# Patient Record
Sex: FEMALE | Race: BLACK OR AFRICAN AMERICAN
[De-identification: names, ages, dates, MRNs, and addresses within clinical notes are randomized per-mention and may not be internally consistent; named-entity substitution may affect disease eponyms.]

---

## 2019-01-16 ENCOUNTER — HOSPITAL ENCOUNTER (EMERGENCY)
Dept: HOSPITAL 84 - D.ER | Age: 49
Discharge: HOME | End: 2019-01-16
Payer: MEDICARE

## 2019-01-16 VITALS — WEIGHT: 155.33 LBS | HEIGHT: 68 IN | BODY MASS INDEX: 23.54 KG/M2

## 2019-01-16 VITALS — SYSTOLIC BLOOD PRESSURE: 121 MMHG | DIASTOLIC BLOOD PRESSURE: 67 MMHG

## 2019-01-16 DIAGNOSIS — R51: ICD-10-CM

## 2019-01-16 DIAGNOSIS — Z94.0: ICD-10-CM

## 2019-01-16 DIAGNOSIS — N18.9: ICD-10-CM

## 2019-01-16 DIAGNOSIS — R50.9: Primary | ICD-10-CM

## 2019-01-16 DIAGNOSIS — I12.9: ICD-10-CM

## 2019-01-16 DIAGNOSIS — R11.2: ICD-10-CM

## 2019-01-16 DIAGNOSIS — R19.7: ICD-10-CM

## 2019-01-16 LAB
ALBUMIN SERPL-MCNC: 4.1 G/DL (ref 3.4–5)
ALP SERPL-CCNC: 89 U/L (ref 46–116)
ALT SERPL-CCNC: 18 U/L (ref 10–68)
ANION GAP SERPL CALC-SCNC: 17.9 MMOL/L (ref 8–16)
BASOPHILS NFR BLD AUTO: 0.3 % (ref 0–2)
BILIRUB SERPL-MCNC: 0.27 MG/DL (ref 0.2–1.3)
BUN SERPL-MCNC: 20 MG/DL (ref 7–18)
CALCIUM SERPL-MCNC: 8.6 MG/DL (ref 8.5–10.1)
CHLORIDE SERPL-SCNC: 100 MMOL/L (ref 98–107)
CO2 SERPL-SCNC: 22.7 MMOL/L (ref 21–32)
CREAT SERPL-MCNC: 3.2 MG/DL (ref 0.6–1.3)
EOSINOPHIL NFR BLD: 2.3 % (ref 0–7)
ERYTHROCYTE [DISTWIDTH] IN BLOOD BY AUTOMATED COUNT: 14.9 % (ref 11.5–14.5)
GLOBULIN SER-MCNC: 3.4 G/L
GLUCOSE SERPL-MCNC: 99 MG/DL (ref 74–106)
HCT VFR BLD CALC: 33 % (ref 36–48)
HGB BLD-MCNC: 10.9 G/DL (ref 12–16)
IMM GRANULOCYTES NFR BLD: 0.1 % (ref 0–5)
LYMPHOCYTES NFR BLD AUTO: 28.1 % (ref 15–50)
MAGNESIUM SERPL-MCNC: 1.6 MG/DL (ref 1.8–2.4)
MCH RBC QN AUTO: 30.2 PG (ref 26–34)
MCHC RBC AUTO-ENTMCNC: 33 G/DL (ref 31–37)
MCV RBC: 91.4 FL (ref 80–100)
MONOCYTES NFR BLD: 9.6 % (ref 2–11)
NEUTROPHILS NFR BLD AUTO: 59.6 % (ref 40–80)
OSMOLALITY SERPL CALC.SUM OF ELEC: 274 MOSM/KG (ref 275–300)
PLATELET # BLD: 345 10X3/UL (ref 130–400)
PMV BLD AUTO: 8.5 FL (ref 7.4–10.4)
POTASSIUM SERPL-SCNC: 4.6 MMOL/L (ref 3.5–5.1)
PROT SERPL-MCNC: 7.5 G/DL (ref 6.4–8.2)
RBC # BLD AUTO: 3.61 10X6/UL (ref 4–5.4)
SODIUM SERPL-SCNC: 136 MMOL/L (ref 136–145)
WBC # BLD AUTO: 7.1 10X3/UL (ref 4.8–10.8)

## 2019-01-29 ENCOUNTER — HOSPITAL ENCOUNTER (INPATIENT)
Dept: HOSPITAL 84 - D.ER | Age: 49
LOS: 2 days | Discharge: HOME | DRG: 305 | End: 2019-01-31
Attending: INTERNAL MEDICINE | Admitting: INTERNAL MEDICINE
Payer: MEDICARE

## 2019-01-29 VITALS — SYSTOLIC BLOOD PRESSURE: 169 MMHG | DIASTOLIC BLOOD PRESSURE: 107 MMHG

## 2019-01-29 VITALS — DIASTOLIC BLOOD PRESSURE: 81 MMHG | SYSTOLIC BLOOD PRESSURE: 130 MMHG

## 2019-01-29 VITALS — DIASTOLIC BLOOD PRESSURE: 86 MMHG | SYSTOLIC BLOOD PRESSURE: 136 MMHG

## 2019-01-29 VITALS — SYSTOLIC BLOOD PRESSURE: 162 MMHG | DIASTOLIC BLOOD PRESSURE: 99 MMHG

## 2019-01-29 VITALS — DIASTOLIC BLOOD PRESSURE: 82 MMHG | SYSTOLIC BLOOD PRESSURE: 128 MMHG

## 2019-01-29 VITALS
WEIGHT: 157.33 LBS | BODY MASS INDEX: 23.84 KG/M2 | BODY MASS INDEX: 23.84 KG/M2 | HEIGHT: 68 IN | WEIGHT: 157.33 LBS | BODY MASS INDEX: 23.84 KG/M2 | HEIGHT: 68 IN

## 2019-01-29 VITALS — SYSTOLIC BLOOD PRESSURE: 148 MMHG | DIASTOLIC BLOOD PRESSURE: 90 MMHG

## 2019-01-29 VITALS — DIASTOLIC BLOOD PRESSURE: 94 MMHG | SYSTOLIC BLOOD PRESSURE: 146 MMHG

## 2019-01-29 VITALS — DIASTOLIC BLOOD PRESSURE: 108 MMHG | SYSTOLIC BLOOD PRESSURE: 176 MMHG

## 2019-01-29 VITALS — DIASTOLIC BLOOD PRESSURE: 97 MMHG | SYSTOLIC BLOOD PRESSURE: 154 MMHG

## 2019-01-29 VITALS — SYSTOLIC BLOOD PRESSURE: 168 MMHG | DIASTOLIC BLOOD PRESSURE: 123 MMHG

## 2019-01-29 VITALS — DIASTOLIC BLOOD PRESSURE: 88 MMHG | SYSTOLIC BLOOD PRESSURE: 148 MMHG

## 2019-01-29 VITALS — DIASTOLIC BLOOD PRESSURE: 91 MMHG | SYSTOLIC BLOOD PRESSURE: 151 MMHG

## 2019-01-29 VITALS — SYSTOLIC BLOOD PRESSURE: 161 MMHG | DIASTOLIC BLOOD PRESSURE: 107 MMHG

## 2019-01-29 VITALS — DIASTOLIC BLOOD PRESSURE: 76 MMHG | SYSTOLIC BLOOD PRESSURE: 121 MMHG

## 2019-01-29 VITALS — SYSTOLIC BLOOD PRESSURE: 137 MMHG | DIASTOLIC BLOOD PRESSURE: 86 MMHG

## 2019-01-29 VITALS — SYSTOLIC BLOOD PRESSURE: 175 MMHG | DIASTOLIC BLOOD PRESSURE: 98 MMHG

## 2019-01-29 VITALS — SYSTOLIC BLOOD PRESSURE: 170 MMHG | DIASTOLIC BLOOD PRESSURE: 102 MMHG

## 2019-01-29 VITALS — SYSTOLIC BLOOD PRESSURE: 158 MMHG | DIASTOLIC BLOOD PRESSURE: 100 MMHG

## 2019-01-29 VITALS — SYSTOLIC BLOOD PRESSURE: 163 MMHG | DIASTOLIC BLOOD PRESSURE: 103 MMHG

## 2019-01-29 VITALS — DIASTOLIC BLOOD PRESSURE: 89 MMHG | SYSTOLIC BLOOD PRESSURE: 147 MMHG

## 2019-01-29 VITALS — SYSTOLIC BLOOD PRESSURE: 127 MMHG | DIASTOLIC BLOOD PRESSURE: 76 MMHG

## 2019-01-29 VITALS — SYSTOLIC BLOOD PRESSURE: 143 MMHG | DIASTOLIC BLOOD PRESSURE: 89 MMHG

## 2019-01-29 VITALS — DIASTOLIC BLOOD PRESSURE: 88 MMHG | SYSTOLIC BLOOD PRESSURE: 143 MMHG

## 2019-01-29 VITALS — DIASTOLIC BLOOD PRESSURE: 99 MMHG | SYSTOLIC BLOOD PRESSURE: 180 MMHG

## 2019-01-29 VITALS — DIASTOLIC BLOOD PRESSURE: 113 MMHG | SYSTOLIC BLOOD PRESSURE: 175 MMHG

## 2019-01-29 VITALS — DIASTOLIC BLOOD PRESSURE: 100 MMHG | SYSTOLIC BLOOD PRESSURE: 158 MMHG

## 2019-01-29 VITALS — SYSTOLIC BLOOD PRESSURE: 153 MMHG | DIASTOLIC BLOOD PRESSURE: 91 MMHG

## 2019-01-29 VITALS — SYSTOLIC BLOOD PRESSURE: 159 MMHG | DIASTOLIC BLOOD PRESSURE: 92 MMHG

## 2019-01-29 VITALS — SYSTOLIC BLOOD PRESSURE: 186 MMHG | DIASTOLIC BLOOD PRESSURE: 119 MMHG

## 2019-01-29 VITALS — SYSTOLIC BLOOD PRESSURE: 164 MMHG | DIASTOLIC BLOOD PRESSURE: 103 MMHG

## 2019-01-29 VITALS — SYSTOLIC BLOOD PRESSURE: 113 MMHG | DIASTOLIC BLOOD PRESSURE: 69 MMHG

## 2019-01-29 VITALS — SYSTOLIC BLOOD PRESSURE: 114 MMHG | DIASTOLIC BLOOD PRESSURE: 67 MMHG

## 2019-01-29 VITALS — SYSTOLIC BLOOD PRESSURE: 157 MMHG | DIASTOLIC BLOOD PRESSURE: 74 MMHG

## 2019-01-29 VITALS — DIASTOLIC BLOOD PRESSURE: 101 MMHG | SYSTOLIC BLOOD PRESSURE: 161 MMHG

## 2019-01-29 DIAGNOSIS — N18.3: ICD-10-CM

## 2019-01-29 DIAGNOSIS — D63.1: ICD-10-CM

## 2019-01-29 DIAGNOSIS — I16.0: Primary | ICD-10-CM

## 2019-01-29 DIAGNOSIS — K21.9: ICD-10-CM

## 2019-01-29 DIAGNOSIS — I47.1: ICD-10-CM

## 2019-01-29 DIAGNOSIS — I12.9: ICD-10-CM

## 2019-01-29 DIAGNOSIS — I24.8: ICD-10-CM

## 2019-01-29 DIAGNOSIS — Z94.0: ICD-10-CM

## 2019-01-29 LAB
ALBUMIN SERPL-MCNC: 4 G/DL (ref 3.4–5)
ALP SERPL-CCNC: 84 U/L (ref 46–116)
ALT SERPL-CCNC: 14 U/L (ref 10–68)
ANION GAP SERPL CALC-SCNC: 14 MMOL/L (ref 8–16)
BASOPHILS NFR BLD AUTO: 0.3 % (ref 0–2)
BILIRUB SERPL-MCNC: 0.38 MG/DL (ref 0.2–1.3)
BUN SERPL-MCNC: 14 MG/DL (ref 7–18)
CALCIUM SERPL-MCNC: 8.9 MG/DL (ref 8.5–10.1)
CHLORIDE SERPL-SCNC: 98 MMOL/L (ref 98–107)
CK MB SERPL-MCNC: 0.8 U/L (ref 0–3.6)
CK SERPL-CCNC: 219 UL (ref 21–215)
CO2 SERPL-SCNC: 25.8 MMOL/L (ref 21–32)
CREAT SERPL-MCNC: 2.7 MG/DL (ref 0.6–1.3)
EOSINOPHIL NFR BLD: 2.3 % (ref 0–7)
ERYTHROCYTE [DISTWIDTH] IN BLOOD BY AUTOMATED COUNT: 14.1 % (ref 11.5–14.5)
GLOBULIN SER-MCNC: 3.4 G/L
GLUCOSE SERPL-MCNC: 109 MG/DL (ref 74–106)
HCT VFR BLD CALC: 34.3 % (ref 36–48)
HGB BLD-MCNC: 11.4 G/DL (ref 12–16)
IMM GRANULOCYTES NFR BLD: 0.1 % (ref 0–5)
LYMPHOCYTES NFR BLD AUTO: 33.3 % (ref 15–50)
MCH RBC QN AUTO: 30.6 PG (ref 26–34)
MCHC RBC AUTO-ENTMCNC: 33.2 G/DL (ref 31–37)
MCV RBC: 92 FL (ref 80–100)
MONOCYTES NFR BLD: 10 % (ref 2–11)
NEUTROPHILS NFR BLD AUTO: 54 % (ref 40–80)
OSMOLALITY SERPL CALC.SUM OF ELEC: 269 MOSM/KG (ref 275–300)
PLATELET # BLD: 310 10X3/UL (ref 130–400)
PMV BLD AUTO: 8.7 FL (ref 7.4–10.4)
POTASSIUM SERPL-SCNC: 3.8 MMOL/L (ref 3.5–5.1)
PROT SERPL-MCNC: 7.4 G/DL (ref 6.4–8.2)
RBC # BLD AUTO: 3.73 10X6/UL (ref 4–5.4)
SODIUM SERPL-SCNC: 134 MMOL/L (ref 136–145)
TROPONIN I SERPL-MCNC: < 0.017 NG/ML (ref 0–0.06)
WBC # BLD AUTO: 8 10X3/UL (ref 4.8–10.8)

## 2019-01-29 NOTE — NUR
REASSESSMENT COMPLETE, NO NEW CHANGES AT THIS TIME. PT RESTING QUIETLY WITH
VSS, NO S/S OF DISTRESS. PT DENIES NEEDS AT THIS TIME. CALL LIGHT WITHIN PT
REACH. CPOC.

## 2019-01-29 NOTE — NUR
PRIOR TO FIRST SL NITRO, PT RATED CHEST PRESSURE 10/10, FOLLOWING NITRO, PT
RATED DISCOMFORT 9/10. SECOND PRN DOSE ADMINISTERED.

## 2019-01-29 NOTE — NUR
PT ASSISTED UP TO TOILET. GAIT STEADY. URINATED. PT FELT LIKE WAS SHORT OF
BREATH AND HEART BEATING FAST. C/O CHEST DISCOMFORT IN RIGHT CHEST.
READING 'S  HR. AFTER SITUATED IN BED AND RELAX HR HAS DROPPED TO
110. BP CONTINUES TO REMAIN ELEVATED. ON CARDENE GTT AT THIS TIME. CONTINUING
TO TITRATE

## 2019-01-29 NOTE — NUR
PT AOX4, FOLLOWS COMMANDS. PUPILS EQUAL AND REACTIVE,  EQUAL, TONGUE
MIDLINE. NO S/S OF WEAKNESS, STANDS WITH A STEADY GAIT, AMBULATES WITH A
STEADY GAIT. RESPIRATIONS UNLABORED, SPO2 96 ON ROOM AIR. S1S2 HEARD,
PERIPHERAL PULSES PRESENT.  ON MONITOR. BOWEL SOUNDS ACTIVE IN ALL
QUADRANTS. LT UPPER ARM FISTULA CDI, THRILL AND BRUIT PRESENT. PT REPOSITIONS
SELF INDEPENDENTLY. CALL LIGHT WITHIN PT REACH, DENIES NEEDS. CPOC.

## 2019-01-29 NOTE — NUR
PT UP TO BATHROOM WITH MINIMAL ASSISTANCE. STEADY GAIT, VSS, NO S/S OF
DISTRESS. REPOSITIONED BACK IN BED FOR COMFORT. HS MEDS GIVEN WITH FRESH
WATER. PT DENIES ANY FURTHER NEEDS AT THIS TIME. CALL LIGHT WITHIN PT REACH.
CPOC.

## 2019-01-30 VITALS — DIASTOLIC BLOOD PRESSURE: 102 MMHG | SYSTOLIC BLOOD PRESSURE: 153 MMHG

## 2019-01-30 VITALS — SYSTOLIC BLOOD PRESSURE: 139 MMHG | DIASTOLIC BLOOD PRESSURE: 85 MMHG

## 2019-01-30 VITALS — DIASTOLIC BLOOD PRESSURE: 91 MMHG | SYSTOLIC BLOOD PRESSURE: 140 MMHG

## 2019-01-30 VITALS — DIASTOLIC BLOOD PRESSURE: 60 MMHG | SYSTOLIC BLOOD PRESSURE: 109 MMHG

## 2019-01-30 VITALS — SYSTOLIC BLOOD PRESSURE: 149 MMHG | DIASTOLIC BLOOD PRESSURE: 96 MMHG

## 2019-01-30 VITALS — SYSTOLIC BLOOD PRESSURE: 129 MMHG | DIASTOLIC BLOOD PRESSURE: 75 MMHG

## 2019-01-30 VITALS — SYSTOLIC BLOOD PRESSURE: 128 MMHG | DIASTOLIC BLOOD PRESSURE: 78 MMHG

## 2019-01-30 VITALS — SYSTOLIC BLOOD PRESSURE: 157 MMHG | DIASTOLIC BLOOD PRESSURE: 93 MMHG

## 2019-01-30 VITALS — DIASTOLIC BLOOD PRESSURE: 72 MMHG | SYSTOLIC BLOOD PRESSURE: 130 MMHG

## 2019-01-30 VITALS — SYSTOLIC BLOOD PRESSURE: 128 MMHG | DIASTOLIC BLOOD PRESSURE: 80 MMHG

## 2019-01-30 VITALS — SYSTOLIC BLOOD PRESSURE: 161 MMHG | DIASTOLIC BLOOD PRESSURE: 75 MMHG

## 2019-01-30 VITALS — SYSTOLIC BLOOD PRESSURE: 162 MMHG | DIASTOLIC BLOOD PRESSURE: 95 MMHG

## 2019-01-30 VITALS — SYSTOLIC BLOOD PRESSURE: 123 MMHG | DIASTOLIC BLOOD PRESSURE: 72 MMHG

## 2019-01-30 VITALS — SYSTOLIC BLOOD PRESSURE: 158 MMHG | DIASTOLIC BLOOD PRESSURE: 99 MMHG

## 2019-01-30 VITALS — DIASTOLIC BLOOD PRESSURE: 76 MMHG | SYSTOLIC BLOOD PRESSURE: 166 MMHG

## 2019-01-30 VITALS — DIASTOLIC BLOOD PRESSURE: 74 MMHG | SYSTOLIC BLOOD PRESSURE: 121 MMHG

## 2019-01-30 VITALS — DIASTOLIC BLOOD PRESSURE: 92 MMHG | SYSTOLIC BLOOD PRESSURE: 149 MMHG

## 2019-01-30 VITALS — DIASTOLIC BLOOD PRESSURE: 92 MMHG | SYSTOLIC BLOOD PRESSURE: 155 MMHG

## 2019-01-30 VITALS — DIASTOLIC BLOOD PRESSURE: 87 MMHG | SYSTOLIC BLOOD PRESSURE: 147 MMHG

## 2019-01-30 VITALS — SYSTOLIC BLOOD PRESSURE: 158 MMHG | DIASTOLIC BLOOD PRESSURE: 95 MMHG

## 2019-01-30 LAB
ANION GAP SERPL CALC-SCNC: 16.5 MMOL/L (ref 8–16)
BASOPHILS NFR BLD AUTO: 0.3 % (ref 0–2)
BUN SERPL-MCNC: 15 MG/DL (ref 7–18)
CALCIUM SERPL-MCNC: 8 MG/DL (ref 8.5–10.1)
CHLORIDE SERPL-SCNC: 99 MMOL/L (ref 98–107)
CO2 SERPL-SCNC: 23.6 MMOL/L (ref 21–32)
CREAT SERPL-MCNC: 2.7 MG/DL (ref 0.6–1.3)
EOSINOPHIL NFR BLD: 2.6 % (ref 0–7)
ERYTHROCYTE [DISTWIDTH] IN BLOOD BY AUTOMATED COUNT: 14.3 % (ref 11.5–14.5)
GLUCOSE SERPL-MCNC: 104 MG/DL (ref 74–106)
HCT VFR BLD CALC: 32.3 % (ref 36–48)
HGB BLD-MCNC: 10.6 G/DL (ref 12–16)
IMM GRANULOCYTES NFR BLD: 0.1 % (ref 0–5)
LYMPHOCYTES NFR BLD AUTO: 32.6 % (ref 15–50)
MAGNESIUM SERPL-MCNC: 1.5 MG/DL (ref 1.8–2.4)
MCH RBC QN AUTO: 29.9 PG (ref 26–34)
MCHC RBC AUTO-ENTMCNC: 32.8 G/DL (ref 31–37)
MCV RBC: 91.2 FL (ref 80–100)
MONOCYTES NFR BLD: 13.4 % (ref 2–11)
NEUTROPHILS NFR BLD AUTO: 51 % (ref 40–80)
OSMOLALITY SERPL CALC.SUM OF ELEC: 272 MOSM/KG (ref 275–300)
PHOSPHATE SERPL-MCNC: 4 MG/DL (ref 2.5–4.9)
PLATELET # BLD: 286 10X3/UL (ref 130–400)
PMV BLD AUTO: 8.6 FL (ref 7.4–10.4)
POTASSIUM SERPL-SCNC: 3.1 MMOL/L (ref 3.5–5.1)
RBC # BLD AUTO: 3.54 10X6/UL (ref 4–5.4)
SODIUM SERPL-SCNC: 136 MMOL/L (ref 136–145)
WBC # BLD AUTO: 7.4 10X3/UL (ref 4.8–10.8)

## 2019-01-30 NOTE — NUR
REASSESSMENT COMPLETE, NO NEW CHANGES AT THIS TIME. PT UP TO BATHROOM WITH
STEADY GAIT. PARTIAL LINEN CHANGE PROVIDED. DENIES NEEDS. CALL LIGHT WITHIN PT
REACH. CPOC.

## 2019-01-30 NOTE — NUR
PT IN BED RESTING COMFORTABLEY VERBALIZES NO COMPLAINTS. WATER PROVIDED PER PT
REQUEST. VSS WILL CONTINUE TO MONITOR.

## 2019-01-30 NOTE — NUR
AMBULATED TO BATHROOM INDEPENDENTLY. COFFEE PROVIDED PER PT REQUEST. WARM
BLANKET PROVIDED. CARDIZEM TAB GIVEN AT THIS TIME PER ORDERS. WILL CONTINUE TO
MONITOR.

## 2019-01-30 NOTE — NUR
REPORT RECEIVED CARE ASSUMED. ASSESSMENT DONE SEE FLWO SHEET. VSS INCREASE IN
BP NOTED. NO SIGNS OF
ACUTE DISTRESS NOTED. ICU MONITORS IN PLACE ALARMS SET AND VERIFIED.

## 2019-01-30 NOTE — NUR
PT SLEEPING QUIETLY WITH UNLABORED RESPIRATIONS. VSS, NO S/S OF DISTRESS. PT
REPOSITIONS SELF INDEPENDENTLY. CALL LIGHT WITHIN PT REACH. CPOC.

## 2019-01-30 NOTE — MORECARE
CASE MANAGEMENT DISCHARGE SUMMARY
 
 
PATIENT: MACHO WHITFIELD         UNIT: [id number]
ACCOUNT#: [id number]                       ADM DATE: 19
AGE: 48     : 70  SEX: F            ROOM/BED: [facility]    
AUTHOR: NIKOLAS BRUSH                             PHYSICIAN:                               
 
REFERRING PHYSICIAN: ILIANA COLEMAN MD       
DATE OF SERVICE: 19
Discharge Plan
 
 
Patient Name: MACHO WHITFIELD
Facility: [facility]:[place]
Encounter #: [id number]
Medical Record #: [id number]
: 1970
Planned Disposition: Home
Anticipated Discharge Date: 
 
Discharge Date: 
Expected LOS: 
Initial Reviewer: [email or web address]
Initial Review Date: 2019
Generated: 19  11:54 am 
 DCPIA - Discharge Planning Initial Assessment
 
Updated by [email or web address]: Prema Otoole on 19  10:52 am
*  Is the patient Alert and Oriented?
Yes
*  How many steps to enter\exit or inside your home? 13 *  PCP TIFFANY COLEMAN *  Pharmacy
[facility]/ [facility]
*  Preadmission Environment
Home with Family
*  ADLs
Independent
*  Other Equipment
B/P MACHINE
*  List name and contact numbers for known caregivers / representatives who 
currently or will assist patient after discharge:
YOHANA MCKENZIE - [facility]- [phone]
*  Verbal permission to speak to the caregivers and representatives has been 
obtained from the patient.
Yes
*  Community resources currently utilized
None
*  Additional services required to return to the preadmission environment?
No
*  Can the patient safely return to the preadmission environment?
 
Yes
*  Has this patient been hospitalized within the prior 30 days at any 
hospital?
No
 
 
 
 
 
 
Patient Name: MACHO WHITFIELD
Encounter #: [id number]
Page [phone]
 
 
 
 
 
Electronically Signed by NIKOLAS BRUSH on 19 at 1054
 
 
 
 
 
 
**All edits/amendments must be made on the electronic document**
 
DICTATION DATE: 19 1053     : DRU  19 1053     
RPT#: [id number]                                DC DATE:        
                                               STATUS: ADM IN  
[facility]
[address] [place] [address]
***END OF REPORT***

## 2019-01-30 NOTE — NUR
RESTING COMFORTABLY IN BED. HAD COFFEE BUT DID NOT EAT MUCH OF HER BREAKFAST.
AM MEDS GIVEN. PT DENIES FURTHER NEEDS AT THIS TIME. WILL CONTINUE TO MONITOR.

## 2019-01-30 NOTE — NUR
PT SLEEPING QUIETLY WITH VSS, REPOSITIONS SELF INDEPENDENTLY. NO S/S OF
DISTRESS AT THIS TIME. CALL LIGHT WITHIN PT REACH. CPOC.

## 2019-01-30 NOTE — NUR
RESTING COMFORTABLY IN BED. BP IN 150S AND 160S. MEAL TRAY DELIVERED AND SET
UP. WILL CONTINUE TO MONITOR.

## 2019-01-30 NOTE — NUR
MEDS GIVEN PER MAR. VSS. DR MOORE INFORMED OF PT STATUS. NO SIGNS OF ACUTE
DISTRESS NOTED WILL CONTINUE TO MONITOR.

## 2019-01-30 NOTE — NUR
SHIFT REPORT RECEIVED. AA&OX4. DENIES PAIN AT THIS TIME. HAS R-FOREARM PIV
WITH NS AT 10ML/HR AND CARDIZEM AT 5ML/HR. AMBULATES TO RESTROOM WITHOUT
DIFFICULTIES. VOIDED X 1. ON ROOM AIR. VSS. NO FEVER NOTED. FISTULA ON ALON.
SHIFT ASSESSMENT COMPLETED. WILL CONTINUE TO MONITOR.

## 2019-01-30 NOTE — NUR
CARDIZEM DRIP DC'JOANNA PER ORDERS. AMBULATED TO BATHROOM AT THIS TIME. DENIES
FURTHER NEEDS. WILL CONTINUE TO MONITOR.

## 2019-01-31 VITALS — DIASTOLIC BLOOD PRESSURE: 95 MMHG | SYSTOLIC BLOOD PRESSURE: 149 MMHG

## 2019-01-31 VITALS — DIASTOLIC BLOOD PRESSURE: 99 MMHG | SYSTOLIC BLOOD PRESSURE: 159 MMHG

## 2019-01-31 LAB
ALBUMIN SERPL-MCNC: 3.8 G/DL (ref 3.4–5)
ALP SERPL-CCNC: 80 U/L (ref 46–116)
ALT SERPL-CCNC: 15 U/L (ref 10–68)
ANION GAP SERPL CALC-SCNC: 17.2 MMOL/L (ref 8–16)
BASOPHILS NFR BLD AUTO: 0.4 % (ref 0–2)
BILIRUB SERPL-MCNC: 0.41 MG/DL (ref 0.2–1.3)
BUN SERPL-MCNC: 14 MG/DL (ref 7–18)
CALCIUM SERPL-MCNC: 8.7 MG/DL (ref 8.5–10.1)
CHLORIDE SERPL-SCNC: 98 MMOL/L (ref 98–107)
CO2 SERPL-SCNC: 23 MMOL/L (ref 21–32)
CREAT SERPL-MCNC: 2.6 MG/DL (ref 0.6–1.3)
EOSINOPHIL NFR BLD: 1.9 % (ref 0–7)
ERYTHROCYTE [DISTWIDTH] IN BLOOD BY AUTOMATED COUNT: 14.5 % (ref 11.5–14.5)
GLOBULIN SER-MCNC: 3.5 G/L
GLUCOSE SERPL-MCNC: 103 MG/DL (ref 74–106)
HCT VFR BLD CALC: 34 % (ref 36–48)
HGB BLD-MCNC: 11.3 G/DL (ref 12–16)
IMM GRANULOCYTES NFR BLD: 0.1 % (ref 0–5)
LYMPHOCYTES NFR BLD AUTO: 35.1 % (ref 15–50)
MCH RBC QN AUTO: 30.6 PG (ref 26–34)
MCHC RBC AUTO-ENTMCNC: 33.2 G/DL (ref 31–37)
MCV RBC: 92.1 FL (ref 80–100)
MONOCYTES NFR BLD: 11 % (ref 2–11)
NEUTROPHILS NFR BLD AUTO: 51.5 % (ref 40–80)
OSMOLALITY SERPL CALC.SUM OF ELEC: 270 MOSM/KG (ref 275–300)
PLATELET # BLD: 310 10X3/UL (ref 130–400)
PMV BLD AUTO: 8.8 FL (ref 7.4–10.4)
POTASSIUM SERPL-SCNC: 3.2 MMOL/L (ref 3.5–5.1)
PROT SERPL-MCNC: 7.3 G/DL (ref 6.4–8.2)
RBC # BLD AUTO: 3.69 10X6/UL (ref 4–5.4)
SODIUM SERPL-SCNC: 135 MMOL/L (ref 136–145)
WBC # BLD AUTO: 8.4 10X3/UL (ref 4.8–10.8)

## 2019-01-31 NOTE — NUR
SHIFT REPORT RECEIVED. RESTING ON RIGHT SIDE. AROUSES TO VOICE. ON ROOM AIR.
/99, ORAL TEMP 98.8, RR 27, O2 %. DENIES HAVING PAIN AT THIS
TIME. HAS ALON FISTULA. R-FOREARM PIV SALINE LOCK. SHIFT ASSESSMENT COMPLETED.
NO FURTHER NEEDS AT THIS TIME. WILL CONTINUE TO MONITOR.

## 2019-01-31 NOTE — MORECARE
CASE MANAGEMENT DISCHARGE SUMMARY
 
 
PATIENT: MACHO WHITFIELD         UNIT: [id number]
ACCOUNT#: [id number]                       ADM DATE: 19
AGE: 48     : 70  SEX: F            [facility]    
AUTHOR: TRUDI,DOC                             PHYSICIAN:                               
 
REFERRING PHYSICIAN: ILIANA COLEMAN MD       
DATE OF SERVICE: 19
Discharge Plan
 
 
Patient Name: MACHO WHITFIELD
Facility: [facility]:[place]
Encounter #: [id number]
Medical Record #: [id number]
: 1970
Planned Disposition: Home
Anticipated Discharge Date: 
 
Discharge Date: 2019
Expected LOS: 
Initial Reviewer: [email or web address]
Initial Review Date: 2019
Generated: 19   2:56 pm 
Comments
 
DCP- Discharge Planning
 
Updated by [id number] Otoole on 19  12:10 pm CT
IMM explained and served 19 @ [facility]. Denies any discharge needs. CM will 
continue to follow and assist as needed with discharge planning / needs.
 DCPIA - Discharge Planning Initial Assessment
 
Updated by [id number]: Prema Otoole on 19  10:52 am
*  Is the patient Alert and Oriented?
Yes
*  How many steps to enter\exit or inside your home? 13 *  PCP TIFFANY COLEMAN *  Pharmacy
[facility]/ [facility]
*  Preadmission Environment
Home with Family
*  ADLs
Independent
*  Other Equipment
B/P MACHINE
*  List name and contact numbers for known caregivers / representatives who 
currently or will assist patient after discharge:
YOHANA MCKENZIE [facility]- [phone]
*  Verbal permission to speak to the caregivers and representatives has been 
 
obtained from the patient.
Yes
*  Community resources currently utilized
None
*  Additional services required to return to the preadmission environment?
No
*  Can the patient safely return to the preadmission environment?
Yes
*  Has this patient been hospitalized within the prior 30 days at any 
hospital?
No
 
 
 
 
 
Coverage Notice
 
Reviewer: [email or web address] Rob Otoole
 
Notice Issued Date-Time: 2019   9:50
Notice Type: 
 
Notice Delivered To: Patient
Relationship to Patient: Self
Representative Name: 
 
Delivery Method: HAND - Hand Delivered
Liza Days:
Prior Verbal Notification: 
 
Recipient Understood Notice: Yes
Recipient Signature: Yes
Med Rec Note Co-signed by Attending:
 
Coverage Notice Comment:  
 
Last DP export: 19  12:18 p
Patient Name: MACHO WHITFIELD
 
Encounter #: [id number]
Page [id number]
 
 
 
 
 
Electronically Signed by NIKOLAS BRUSH on 19 at 1356
 
 
 
 
 
 
**All edits/amendments must be made on the electronic document**
 
DICTATION DATE: 19 1353     : DRU  19 1359     
RPT#: [id number]                                DC DATE:19
                                               STATUS: DIS IN  
[facility]
[address] [place] [address]
***END OF REPORT***

## 2019-01-31 NOTE — MORECARE
CASE MANAGEMENT DISCHARGE SUMMARY
 
 
PATIENT: MACHO WHITFIELD         UNIT: [id number]
ACCOUNT#: [id number]                       ADM DATE: 19
AGE: 48     : 70  SEX: F            [facility]    
AUTHOR: TRUDI,DOC                             PHYSICIAN:                               
 
REFERRING PHYSICIAN: ILIANA COLEMAN MD       
DATE OF SERVICE: 19
Discharge Plan
 
 
Patient Name: MACHO WHITFIELD
Facility: [facility]:[place]
Encounter #: [id number]
Medical Record #: [id number]
: 1970
Planned Disposition: Home
Anticipated Discharge Date: 
 
Discharge Date: 2019
Expected LOS: 
Initial Reviewer: [id number]
Initial Review Date: 2019
Generated: 19   2:18 pm 
Comments
 
DCP- Discharge Planning
 
Updated by [id number] Otoole on 19  12:10 pm CT
IMM explained and served 19 @ [facility]. Denies any discharge needs. CM will 
continue to follow and assist as needed with discharge planning / needs.
 DCPIA - Discharge Planning Initial Assessment
 
Updated by [email or web address]: Prema Otoole on 19  10:52 am
*  Is the patient Alert and Oriented?
Yes
*  How many steps to enter\exit or inside your home? 13 *  PCP TIFFANY COLEMAN *  Pharmacy
[facility]/ [facility]
*  Preadmission Environment
Home with Family
*  ADLs
Independent
*  Other Equipment
B/P MACHINE
*  List name and contact numbers for known caregivers / representatives who 
currently or will assist patient after discharge:
YOHANA MCKENZIE [facility]- [phone]
*  Verbal permission to speak to the caregivers and representatives has been 
 
obtained from the patient.
Yes
*  Community resources currently utilized
None
*  Additional services required to return to the preadmission environment?
No
*  Can the patient safely return to the preadmission environment?
Yes
*  Has this patient been hospitalized within the prior 30 days at any 
hospital?
No
 
 
 
 
 
Coverage Notice
 
Reviewer: [email or web address] Rob Otoole
 
Notice Issued Date-Time: 2019   9:50
Notice Type: 
 
Notice Delivered To: Patient
Relationship to Patient: Self
Representative Name: 
 
Delivery Method: HAND - Hand Delivered
Liza Days:
Prior Verbal Notification: 
 
Recipient Understood Notice: Yes
Recipient Signature: Yes
Med Rec Note Co-signed by Attending:
 
Coverage Notice Comment:  
 
Last DP export: 19   9:54 a
Patient Name: MACHO WHITFIELD
 
Encounter #: [id number]
Page [phone]
 
 
 
 
 
Electronically Signed by NIKOLAS BRUSH on 19 at 1318
 
 
 
 
 
 
**All edits/amendments must be made on the electronic document**
 
DICTATION DATE: 19     : DRU  198     
RPT#: [id number]                                DC DATE:19
                                               STATUS: DIS IN  
[facility]
[address] [place] [address]
***END OF REPORT***

## 2019-01-31 NOTE — NUR
DISCHARGED AT 1111. DISCHARGE INSTRUCTIONS GIVEN TO PT. PERSONAL BELONGINGS
SENT HOME WITH PT. TRANSPORTED TO PERSONAL VEHICLE VIA WHEELCHAIR. GOING HOME
WITH HER AUNT.

## 2019-01-31 NOTE — NUR
DISCHARGE INSTRUCTION REVIEWED WITH PTManuel WINTER ON RIGHT FOREARM DC'D WITH
CATHETER TIP INTACT. WAITING ON RIDE TO COME PICK HER UP.

## 2019-02-01 NOTE — EC
PATIENT:MACHO WHITFIELD     DATE OF SERVICE: 01/29/19
SEX: F                                  MEDICAL RECORD: [id number]
DATE OF BIRTH: [birth year]                        [facility]
AGE OF PATIENT: 48                             ADMISSION DATE: 01/29/19
 
REFERRING PHYSICIAN:                               
 
INTERPRETING PHYSICIAN: THANIA TYLER MD             
 
 
 
                             ECHOCARDIOGRAM REPORT
  ECHO CHARGES                                               Date:         
 
 
 
CLINICAL DIAGNOSIS:                               
 
                         ECHOCARDIOGRAPHIC MEASUREMENTS
      (adult normal given)
   AC root (d.<3.7cm)      cm   LV Septum d (<1.2 cm>      cm
      Valve Excursion      cm     LV Septum (systole)      cm
Left Atria (s.<4.0cm>      cm          LVPW d(<1.2cm)      cm
        RV (d.<2.3cm)      cm           LVPW (sytole)      cm
  LV diastole(<5.6CM)      cm       MV E-F(>70mm/sec)      cm
           LV systole      cm           LVOT Diameter      cm
       MV exc.(>10mm)      cm
Est.ejection fraction (50-75%)     %
 
   DOPPLER:
     LVIT      cm/sec A      cm/sec E       cm/sec
       LA      cm/sec      RVSP      mmHg
     LVOT      cm/sec   AOP1/2T      m/s
  Asc. Ao      cm/sec
     RVOT      cm/sec
       RA      cm/sec
       PA      cm/sec
 AV Gradient Peak      mmHg  AV Mean      mmHg  AV Area      cm
 MV Gradient Peak      mmHg  MV Mean      mmHg  MV Area      cm
   COMMENTS:                                              
 
 
 Cardiac Sonographer:                                          
      Cardiologist:                                     
             TAPE#                
                                       Pericardial Effusion                          
 
 
DATE OF SERVICE:  01/30/2019
 
DATE OF SERVICE:  01/30/2019
 
ECHOCARDIOGRAM
 
FINDINGS:
1.  Left ventricular chamber size is within normal limits.  Left ventricular
systolic function is normal.  Overall ejection fraction is estimated at 60%.
2.  Left atrium, right atrium, and right ventricle chamber sizes are within
 
 
 
ECHOCARDIOGRAM REPORT                          [id number]    SUNG WHITFIELD
 
 
normal limits.
3.  Valvular structures have normal structure and motion.
4.  Doppler interrogation reveals trace mitral regurgitation, mild tricuspid
regurgitation.  No other valvular insufficiency or stenosis.  Pulmonary systolic
pressure is estimated at 30 mmHg.
5.  Small pericardial effusion is present.  This is not hemodynamically
significant.
 
TRANSINT:[id number] Voice Confirmation ID: [id number] DOCUMENT ID: [id number]
                                           
                                           THANIA TYLER MD             
 
 
 
Electronically Signed by THANIA TYLER on 02/01/19 at 1211
 
 
 
 
 
 
 
 
 
 
 
 
 
 
 
 
 
 
 
 
 
 
 
 
 
 
 
 
 
 
 
CC:                                                             [id number]
DICTATION DATE: 01/30/19 1101     :     01/30/19 1112      DIS IN  
                                                                      01/31/19
[facility]                                          
[address] [place] [address]

## 2019-04-29 ENCOUNTER — HOSPITAL ENCOUNTER (EMERGENCY)
Dept: HOSPITAL 84 - D.ER | Age: 49
Discharge: LEFT BEFORE DISCHARGE | End: 2019-04-29
Payer: MEDICARE

## 2019-04-29 VITALS — SYSTOLIC BLOOD PRESSURE: 168 MMHG | DIASTOLIC BLOOD PRESSURE: 107 MMHG

## 2019-04-29 VITALS — BODY MASS INDEX: 22.93 KG/M2 | HEIGHT: 68 IN | WEIGHT: 151.32 LBS

## 2019-04-29 DIAGNOSIS — I10: ICD-10-CM

## 2019-04-29 DIAGNOSIS — R07.9: Primary | ICD-10-CM

## 2019-04-29 LAB
ALBUMIN SERPL-MCNC: 4 G/DL (ref 3.4–5)
ALP SERPL-CCNC: 97 U/L (ref 46–116)
ALT SERPL-CCNC: 16 U/L (ref 10–68)
ANION GAP SERPL CALC-SCNC: 19.3 MMOL/L (ref 8–16)
BASOPHILS NFR BLD AUTO: 0.3 % (ref 0–2)
BILIRUB SERPL-MCNC: 0.41 MG/DL (ref 0.2–1.3)
BUN SERPL-MCNC: 15 MG/DL (ref 7–18)
CALCIUM SERPL-MCNC: 8.8 MG/DL (ref 8.5–10.1)
CHLORIDE SERPL-SCNC: 101 MMOL/L (ref 98–107)
CK MB SERPL-MCNC: 0.7 U/L (ref 0–3.6)
CK SERPL-CCNC: 186 UL (ref 21–215)
CO2 SERPL-SCNC: 21.3 MMOL/L (ref 21–32)
CREAT SERPL-MCNC: 2.6 MG/DL (ref 0.6–1.3)
D DIMER PPP FEU-MCNC: 0.66 UG/MLFEU (ref 0.2–0.54)
EOSINOPHIL NFR BLD: 0.8 % (ref 0–7)
ERYTHROCYTE [DISTWIDTH] IN BLOOD BY AUTOMATED COUNT: 13.8 % (ref 11.5–14.5)
GLOBULIN SER-MCNC: 3.7 G/L
GLUCOSE SERPL-MCNC: 113 MG/DL (ref 74–106)
HCT VFR BLD CALC: 31.1 % (ref 36–48)
HGB BLD-MCNC: 10.5 G/DL (ref 12–16)
IMM GRANULOCYTES NFR BLD: 0.3 % (ref 0–5)
INR PPP: 1.03 (ref 0.85–1.17)
LYMPHOCYTES NFR BLD AUTO: 29.6 % (ref 15–50)
MCH RBC QN AUTO: 30 PG (ref 26–34)
MCHC RBC AUTO-ENTMCNC: 33.8 G/DL (ref 31–37)
MCV RBC: 88.9 FL (ref 80–100)
MONOCYTES NFR BLD: 9.1 % (ref 2–11)
NEUTROPHILS NFR BLD AUTO: 59.9 % (ref 40–80)
NT-PROBNP SERPL-MCNC: 1059 PG/ML (ref 0–125)
OSMOLALITY SERPL CALC.SUM OF ELEC: 277 MOSM/KG (ref 275–300)
PLATELET # BLD: 320 10X3/UL (ref 130–400)
PMV BLD AUTO: 9 FL (ref 7.4–10.4)
POTASSIUM SERPL-SCNC: 3.6 MMOL/L (ref 3.5–5.1)
PROT SERPL-MCNC: 7.7 G/DL (ref 6.4–8.2)
PROTHROMBIN TIME: 13 SECONDS (ref 11.6–15)
RBC # BLD AUTO: 3.5 10X6/UL (ref 4–5.4)
SODIUM SERPL-SCNC: 138 MMOL/L (ref 136–145)
TROPONIN I SERPL-MCNC: < 0.017 NG/ML (ref 0–0.06)
WBC # BLD AUTO: 7.8 10X3/UL (ref 4.8–10.8)